# Patient Record
Sex: FEMALE | Race: BLACK OR AFRICAN AMERICAN | NOT HISPANIC OR LATINO | Employment: FULL TIME | URBAN - METROPOLITAN AREA
[De-identification: names, ages, dates, MRNs, and addresses within clinical notes are randomized per-mention and may not be internally consistent; named-entity substitution may affect disease eponyms.]

---

## 2019-08-05 ENCOUNTER — HOSPITAL ENCOUNTER (EMERGENCY)
Facility: HOSPITAL | Age: 44
Discharge: HOME | End: 2019-08-05
Attending: EMERGENCY MEDICINE
Payer: COMMERCIAL

## 2019-08-05 VITALS
OXYGEN SATURATION: 99 % | RESPIRATION RATE: 18 BRPM | WEIGHT: 136.4 LBS | HEIGHT: 62 IN | TEMPERATURE: 98 F | SYSTOLIC BLOOD PRESSURE: 101 MMHG | DIASTOLIC BLOOD PRESSURE: 64 MMHG | BODY MASS INDEX: 25.1 KG/M2 | HEART RATE: 83 BPM

## 2019-08-05 DIAGNOSIS — H10.30 CONJUNCTIVITIS, ACUTE: Primary | ICD-10-CM

## 2019-08-05 PROCEDURE — 99283 EMERGENCY DEPT VISIT LOW MDM: CPT | Performed by: EMERGENCY MEDICINE

## 2019-08-05 PROCEDURE — 99282 EMERGENCY DEPT VISIT SF MDM: CPT

## 2019-08-05 RX ORDER — GENTAMICIN SULFATE 3 MG/ML
1 SOLUTION/ DROPS OPHTHALMIC EVERY 4 HOURS
Qty: 5 ML | Refills: 0 | Status: SHIPPED | OUTPATIENT
Start: 2019-08-05 | End: 2019-08-12

## 2019-08-05 RX ORDER — GENTAMICIN SULFATE 3 MG/ML
1 SOLUTION/ DROPS OPHTHALMIC ONCE
Status: COMPLETED | OUTPATIENT
Start: 2019-08-05 | End: 2019-08-05

## 2019-08-05 RX ADMIN — GENTAMICIN SULFATE 1 DROP: 3 SOLUTION/ DROPS OPHTHALMIC at 22:41

## 2019-08-06 NOTE — ED PROVIDER NOTES
History  Chief Complaint   Patient presents with    Eye Drainage     pt with burning eyes and clear drainage in b/l eyes  pt states R eyes is painful  pt concerned for pink eye, pt states she has recurrent pink eye      43-year-old female here for evaluation of drainage from right eye  Started this morning   has the same  States right eye feels irritated     Wound drainage  No periorbital pain or swelling  No pain with extraocular movements  No recent illnesses  The 2 small children at home  She is otherwise healthy  Does not wear glasses or contacts  History provided by:  Patient   used: No    Eye Problem   Location:  Right eye  Quality:  Aching  Severity:  Mild  Onset quality:  Gradual  Duration:  1 day  Timing:  Constant  Progression:  Unchanged  Chronicity:  New  Context: not burn, not chemical exposure, not contact lens problem, not direct trauma, not foreign body, not using machinery, not scratch, not smoke exposure and not UV exposure    Relieved by:  Nothing  Worsened by:  Nothing  Ineffective treatments:  None tried  Associated symptoms: discharge and redness    Associated symptoms: no blurred vision, no crusting, no decreased vision, no double vision, no facial rash, no headaches, no inflammation, no itching, no nausea, no numbness, no photophobia, no scotomas, no swelling, no tearing, no tingling, no vomiting and no weakness    Risk factors: no conjunctival hemorrhage, no exposure to pinkeye, no previous injury to eye, no recent herpes zoster and no recent URI        None       History reviewed  No pertinent past medical history  Past Surgical History:   Procedure Laterality Date     SECTION         History reviewed  No pertinent family history  I have reviewed and agree with the history as documented      Social History     Tobacco Use    Smoking status: Never Smoker    Smokeless tobacco: Never Used   Substance Use Topics    Alcohol use: Not Currently    Drug use: Not Currently        Review of Systems   Eyes: Positive for discharge and redness  Negative for blurred vision, double vision, photophobia, pain and itching  Gastrointestinal: Negative for nausea and vomiting  Neurological: Negative for tingling, weakness, numbness and headaches  All other systems reviewed and are negative  Physical Exam  Physical Exam   Constitutional: She is oriented to person, place, and time  She appears well-developed and well-nourished  Non-toxic appearance  She does not have a sickly appearance  She does not appear ill  No distress  HENT:   Head: Normocephalic and atraumatic  Eyes: Pupils are equal, round, and reactive to light  EOM and lids are normal  Right conjunctiva is injected  Neck: Normal range of motion  Neck supple  Cardiovascular: Normal rate, regular rhythm, S1 normal, S2 normal, normal heart sounds, intact distal pulses and normal pulses  Exam reveals no gallop, no distant heart sounds, no friction rub and no decreased pulses  No murmur heard  Pulses:       Radial pulses are 2+ on the right side, and 2+ on the left side  Pulmonary/Chest: Effort normal and breath sounds normal  No accessory muscle usage  No apnea, no tachypnea and no bradypnea  No respiratory distress  She has no decreased breath sounds  She has no wheezes  She has no rhonchi  She has no rales  Abdominal: Soft  Normal appearance  She exhibits no distension  There is no tenderness  There is no rigidity, no rebound and no guarding  Musculoskeletal: Normal range of motion  She exhibits no edema, tenderness or deformity  Neurological: She is alert and oriented to person, place, and time  No cranial nerve deficit  GCS eye subscore is 4  GCS verbal subscore is 5  GCS motor subscore is 6  Skin: Skin is warm, dry and intact  No rash noted  She is not diaphoretic  No erythema  No pallor     Psychiatric: Her speech is normal    Nursing note and vitals reviewed  Vital Signs  ED Triage Vitals   Temperature Pulse Respirations Blood Pressure SpO2   19   98 °F (36 7 °C) 83 18 101/64 99 %      Temp src Heart Rate Source Patient Position - Orthostatic VS BP Location FiO2 (%)   -- 19 --    Monitor Sitting Left arm       Pain Score       --                  Vitals:    19   BP: 101/64   Pulse: 83   Patient Position - Orthostatic VS: Sitting         Visual Acuity      ED Medications  Medications   gentamicin (GARAMYCIN) 0 3 % ophthalmic solution 1 drop (1 drop Right Eye Given 19)       Diagnostic Studies  Results Reviewed     None                 No orders to display              Procedures  Procedures       ED Course                               MDM  Number of Diagnoses or Management Options  Conjunctivitis, acute: new and requires workup  Diagnosis management comments: Differential diagnosis includes but is not limited to conjunctivitis, scleritis, episcleritis, corneal abrasion, URI  Risk of Complications, Morbidity, and/or Mortality  Presenting problems: low  Management options: low  General comments: Plan/medical decision makin-year-old female with right eye irritation, consistent with acute conjunctivitis  Will treat for bacterial conjunctivitis given purulence discharge  Pt agrees  Return parameters provided  Pt understands and agrees with plan        Patient Progress  Patient progress: stable      Disposition  Final diagnoses:   Conjunctivitis, acute     Time reflects when diagnosis was documented in both MDM as applicable and the Disposition within this note     Time User Action Codes Description Comment    2019 10:33 PM Bree Comment L Add [H10 30] Conjunctivitis, acute       ED Disposition     ED Disposition Condition Date/Time Comment    Discharge Stable Mon Aug 5, 2019 10:33 PM Paticia Bright discharge to home/self care             Follow-up Information    None         Discharge Medication List as of 8/5/2019 10:34 PM      START taking these medications    Details   gentamicin (GARAMYCIN) 0 3 % ophthalmic solution Administer 1 drop to the right eye every 4 (four) hours for 7 days While awake, Starting Mon 8/5/2019, Until Mon 8/12/2019, Print           No discharge procedures on file      ED Provider  Electronically Signed by           Kana Juarez PA-C  08/08/19 1606 ALICIA Brown PA-C  08/08/19 [id number]

## 2022-04-06 ENCOUNTER — EMERGENCY (EMERGENCY)
Facility: HOSPITAL | Age: 47
LOS: 1 days | Discharge: HOME | End: 2022-04-06
Attending: EMERGENCY MEDICINE | Admitting: EMERGENCY MEDICINE
Payer: COMMERCIAL

## 2022-04-06 VITALS
TEMPERATURE: 98 F | RESPIRATION RATE: 15 BRPM | HEART RATE: 95 BPM | DIASTOLIC BLOOD PRESSURE: 74 MMHG | SYSTOLIC BLOOD PRESSURE: 110 MMHG | OXYGEN SATURATION: 100 %

## 2022-04-06 DIAGNOSIS — O34.21 MATERNAL CARE FOR SCAR FROM PREVIOUS CESAREAN DELIVERY: Chronic | ICD-10-CM

## 2022-04-06 PROCEDURE — 99284 EMERGENCY DEPT VISIT MOD MDM: CPT | Mod: 25

## 2022-04-06 PROCEDURE — 93010 ELECTROCARDIOGRAM REPORT: CPT

## 2022-04-06 NOTE — ED PROVIDER NOTE - PATIENT PORTAL LINK FT
You can access the FollowMyHealth Patient Portal offered by [facility] by registering at the following website: [email or web address]. By joining [employer]’s FollowMyHealth portal, you will also be able to view your health information using other applications (apps) compatible with our system.

## 2022-04-06 NOTE — ED PROVIDER NOTE - NSFOLLOWUPINSTRUCTIONS_ED_ALL_ED_FT
Drink plenty of fluids.  Eat a regular diet.  You can take ibuprofen 600mg every 6 hours or Tylenol 650mg every 4 hours as needed for pain or fever.  Follow-up with your PMD as needed.  Return to the emergency department for any new or worsening symptoms.

## 2022-04-06 NOTE — ED ADULT TRIAGE NOTE - CHIEF COMPLAINT QUOTE
alert oriented c/o dizziness and anxiety has been talking about  arrangements for grandmother and is stressed  feels shaky  PMHx anemia

## 2022-04-06 NOTE — ED PROVIDER NOTE - CLINICAL SUMMARY MEDICAL DECISION MAKING FREE TEXT BOX
47 y/o F with episode of dizziness, palpitations, anxiousness in setting of increased stressors and poor po intake today.  Sxs now resolved and exam is normal.  Will check ekg, blood glucose, ucg, po chall, reassess - sxs likely multifactorial in setting of mult stressors and not eating today.  Pt is hd stable and currently asymptomatic, stable for dc home and outpatient follow-up.

## 2022-04-06 NOTE — ED PROVIDER NOTE - OBJECTIVE STATEMENT
47 y/o healthy F here after an episode of dizziness, palpitations, anxiety.  Pt reports that she has been under a lot of stress recently.  She was planning a party for her daughter and getting  arrangements for her grandmother today.  Around 10pm she realized she had not eaten anything all day.  Started to feel shaky, chills, palpitations, dizziness, with waves of brain fog.  Pt was recently told by her PMD that her iron levels were low and was prescribed iron pills, which she had not been taking but she took a dose tonight.  Sxs lasted about an hour and now resolved.  No fever, sob, cp, uri sxs, n/v/d.

## 2022-04-06 NOTE — ED ADULT NURSE NOTE - OBJECTIVE STATEMENT
received pt to trauma a aox4 in no apparent distress VSS c/o episode of dizziness and palpitations that lasted 1 hr. Pt states has been feeling anxious and stresses related to planning  for grandmother. Pt denies any current symptoms. UCG and FS completed. Denies SI/HI feelings related to depression. Pt d/c home

## 2022-04-12 ENCOUNTER — TRANSCRIPTION ENCOUNTER (OUTPATIENT)
Age: 47
End: 2022-04-12

## 2024-10-29 ENCOUNTER — NON-APPOINTMENT (OUTPATIENT)
Age: 49
End: 2024-10-29